# Patient Record
(demographics unavailable — no encounter records)

---

## 2024-10-07 NOTE — DISCUSSION/SUMMARY
[Medication Risks Reviewed] : Medication risks reviewed [Surgical risks reviewed] : Surgical risks reviewed [de-identified] : chronic lumbar pain with acute RLE radiculopathy in setting of degenerative scoliosis Will start with PT Has done extensive conservative mgmt in the past including pt, esis/rfas/mbb need an updated MRI LS to guide treatment plan, likely referral to pain mgmt for repeat LESI vs. surgical considerations flexeril rx f/u after MRI to review

## 2024-10-07 NOTE — HISTORY OF PRESENT ILLNESS
[de-identified] : 10/7/24: This is a 58 yo female with LBP on and off for years. NC from Dr Frank - Worse over the past year Pain radiates down the R leg into the anteriolateral thigh into the knee and shin x 2-3 months no injury. Treated with PT in past with min relief. Aggravated with standing and walking. No bb incontinence. takes nsaids daily  indicated for R TKA with Ruth but wants to address the back first.  Has done PT without much relief. Had ESIs/MBB/RFA without sustained relief. Told at one point wasnt a surgical candidate.  hydrocodone has helped with sleep in the past no prior lumbar surgeries  last seen by Dr Gray in 2020 for CS - hx of acdF C6-7 IN 2003 by outside provider. main complaint is LB and RLE pain at this point  PMHx HTN, HLD. stage zero breast cancer, LCIS following onco. NO recent bone density scan  occupation - works for [employer]  XRs 10/2024 LS- lumbar scoliosis 17 (no change since 2017), spondylosis  AP pelvis - no obvious fracture

## 2024-10-07 NOTE — PHYSICAL EXAM
[Flexion] : flexion [Extension] : extension [] : no atrophy [de-identified] : R L4 dermatome fo pain